# Patient Record
(demographics unavailable — no encounter records)

---

## 2024-11-06 NOTE — HISTORY OF PRESENT ILLNESS
[FreeTextEntry1] :  JOI RODRÍGUEZ (: May  3 1949) is a 75 year old female with venous disease.   She was evaluated by Dr. Donis in 2023 for varicose veins.  At that time VLE noted with bilateral deep reflux and isolated R SFJ reflux.   Today the patient reports unchanged symptoms.  She has been using CS inconsistently, feels some improvement in leg heaviness when wearing them. She still has leg heaviness at long distances, especially when not wearing CS. Notices mild swelling in the legs if standing for prolonged periods of time C/O prominent varicose veins in both legs.  A/P Symptomatic varicose veins.  Advise regular daily use of CS f/u PRN if symptoms not improved -may consider stab phlebectomy.

## 2024-11-06 NOTE — REASON FOR VISIT
[Home] : at home, [unfilled] , at the time of the visit. [Medical Office: ([facility])___] : at the medical office located in  [Verbal consent obtained from patient] : the patient, [unfilled]

## 2024-11-06 NOTE — REASON FOR VISIT
[Home] : at home, [unfilled] , at the time of the visit. [Medical Office: ([facility])___] : at the medical office located in  [Verbal consent obtained from patient] : the patient, [unfilled]

## 2025-01-31 NOTE — HISTORY OF PRESENT ILLNESS
[FreeTextEntry1] : This is a 75 year y/o female with a PMHx of , HTN, HLD, PreDM, GERD, Obesity, +LipoA presents today for follow up.    (2022)  Elevated lipoprotein a 151.6  The patient presents for routine follow up of their coronary artery disease. The patient has been following all secondary prevents measures and antiplatelet therapy. The patient denies shortness of breath, chest pain, dizziness, palpitations, easy bruising/bleeding/hematuria/blood in stool.  The patient is here for evaluation of high blood pressure. Currently tolerating antihypertensive medications. Denies headaches, stiff neck, visual changes, or PND. The patient has been trying to stay on a low-sodium diet.  The patient is also here for f/u of pre-diabetes noted on prior blood test. Attempting to follow a low carbohydrate diet and simple sugars. Denies polyphagia, polydipsia, polyuria or blurry vision.

## 2025-05-13 NOTE — HISTORY OF PRESENT ILLNESS
[FreeTextEntry1] : This is a 76 year y/o female with a PMHx of , HTN, HLD, PreDM, GERD, Obesity, +LipoA presents today for follow up.   (2022) Elevated lipoprotein a 151.6  The patient presents for routine follow up of their coronary artery disease. The patient has been following all secondary prevents measures and antiplatelet therapy. The patient denies shortness of breath, chest pain, dizziness, palpitations, easy bruising/bleeding/hematuria/blood in stool.  The patient is here for evaluation of high blood pressure. Currently tolerating antihypertensive medications. Denies headaches, stiff neck, visual changes, or PND. The patient has been trying to stay on a low-sodium diet.  The patient is also here for f/u of pre-diabetes noted on prior blood test. Attempting to follow a low carbohydrate diet and simple sugars. Denies polyphagia, polydipsia, polyuria or blurry vision.